# Patient Record
Sex: MALE | URBAN - METROPOLITAN AREA
[De-identification: names, ages, dates, MRNs, and addresses within clinical notes are randomized per-mention and may not be internally consistent; named-entity substitution may affect disease eponyms.]

---

## 2020-01-30 ENCOUNTER — APPOINTMENT (OUTPATIENT)
Age: 70
Setting detail: DERMATOLOGY
End: 2020-01-31

## 2020-01-30 DIAGNOSIS — L85.3 XEROSIS CUTIS: ICD-10-CM

## 2020-01-30 DIAGNOSIS — L40.0 PSORIASIS VULGARIS: ICD-10-CM

## 2020-01-30 DIAGNOSIS — D22 MELANOCYTIC NEVI: ICD-10-CM

## 2020-01-30 PROBLEM — L30.9 DERMATITIS, UNSPECIFIED: Status: ACTIVE | Noted: 2020-01-30

## 2020-01-30 PROBLEM — D22.5 MELANOCYTIC NEVI OF TRUNK: Status: ACTIVE | Noted: 2020-01-30

## 2020-01-30 PROBLEM — D22.61 MELANOCYTIC NEVI OF RIGHT UPPER LIMB, INCLUDING SHOULDER: Status: ACTIVE | Noted: 2020-01-30

## 2020-01-30 PROCEDURE — 99203 OFFICE O/P NEW LOW 30 MIN: CPT | Mod: 25

## 2020-01-30 PROCEDURE — 11104 PUNCH BX SKIN SINGLE LESION: CPT

## 2020-01-30 PROCEDURE — OTHER MIPS QUALITY: OTHER

## 2020-01-30 PROCEDURE — 11105 PUNCH BX SKIN EA SEP/ADDL: CPT

## 2020-01-30 PROCEDURE — OTHER COUNSELING: OTHER

## 2020-01-30 PROCEDURE — OTHER BIOPSY BY PUNCH METHOD: OTHER

## 2020-01-30 PROCEDURE — OTHER REASSURANCE: OTHER

## 2020-01-30 PROCEDURE — OTHER PRESCRIPTION: OTHER

## 2020-01-30 RX ORDER — KETOCONAZOLE 20 MG/G
2% CREAM TOPICAL BID
Qty: 1 | Refills: 3 | Status: ERX | COMMUNITY
Start: 2020-01-30

## 2020-01-30 RX ORDER — KETOCONAZOLE 20 MG/ML
2% SHAMPOO TOPICAL
Qty: 1 | Refills: 2 | Status: ERX | COMMUNITY
Start: 2020-01-30

## 2020-01-30 ASSESSMENT — LOCATION ZONE DERM
LOCATION ZONE: ARM
LOCATION ZONE: TRUNK
LOCATION ZONE: LEG
LOCATION ZONE: SCALP

## 2020-01-30 ASSESSMENT — LOCATION DETAILED DESCRIPTION DERM
LOCATION DETAILED: LEFT CENTRAL PARIETAL SCALP
LOCATION DETAILED: RIGHT POSTERIOR SHOULDER
LOCATION DETAILED: LEFT SUPERIOR LATERAL UPPER BACK
LOCATION DETAILED: LEFT DISTAL CALF

## 2020-01-30 ASSESSMENT — LOCATION SIMPLE DESCRIPTION DERM
LOCATION SIMPLE: SCALP
LOCATION SIMPLE: LEFT CALF
LOCATION SIMPLE: RIGHT SHOULDER
LOCATION SIMPLE: LEFT UPPER BACK

## 2020-01-30 NOTE — PROCEDURE: MIPS QUALITY
Quality 110: Preventive Care And Screening: Influenza Immunization: Influenza Immunization not Administered for Documented Reasons.
Quality 474: Zoster Vaccination Status: Shingrix Vaccination not Administered or Previously Received, Reason not Otherwise Specified
Quality 226: Preventive Care And Screening: Tobacco Use: Screening And Cessation Intervention: Patient screened for tobacco use and is an ex/non-smoker
Detail Level: Detailed
Quality 111:Pneumonia Vaccination Status For Older Adults: Pneumococcal Vaccination not Administered or Previously Received, Reason not Otherwise Specified

## 2020-01-30 NOTE — PROCEDURE: BIOPSY BY PUNCH METHOD
Post-Care Instructions: I reviewed with the patient in detail post-care instructions. Patient is to keep the biopsy site dry overnight, and then apply bacitracin twice daily until healed. Patient may apply hydrogen peroxide soaks to remove any crusting.
Additional Anesthesia Volume In Cc (Will Not Render If 0): 0
Anesthesia Volume In Cc (Will Not Render If 0): 0.5
Epidermal Sutures: 4-0 Ethilon
Suture Removal: 14 days
Render Path Notes In Note?: No
Hemostasis: None
Was A Bandage Applied: Yes
Consent: Written consent was obtained and risks were reviewed including but not limited to scarring, infection, bleeding, scabbing, incomplete removal, nerve damage and allergy to anesthesia.
Dressing: bandage
Detail Level: Detailed
Punch Size In Mm: 3
Anesthesia Type: 1% lidocaine with epinephrine
Biopsy Type: H and E
Home Suture Removal Text: Patient was provided a home suture removal kit and will remove their sutures at home.  If they have any questions or difficulties they will call the office.
Wound Care: Bacitracin
Notification Instructions: Patient will be notified of biopsy results. However, patient instructed to call the office if not contacted within 2 weeks.
Billing Type: Third-Party Bill

## 2020-02-13 ENCOUNTER — APPOINTMENT (OUTPATIENT)
Age: 70
Setting detail: DERMATOLOGY
End: 2020-02-13

## 2020-02-13 DIAGNOSIS — Z48.02 ENCOUNTER FOR REMOVAL OF SUTURES: ICD-10-CM

## 2020-02-13 PROCEDURE — OTHER SUTURE REMOVAL (GLOBAL PERIOD): OTHER

## 2020-02-13 PROCEDURE — OTHER PRESCRIPTION: OTHER

## 2020-02-13 PROCEDURE — 99024 POSTOP FOLLOW-UP VISIT: CPT

## 2020-02-13 PROCEDURE — OTHER MIPS QUALITY: OTHER

## 2020-02-13 RX ORDER — CLOBETASOL PROPIONATE 0.5 MG/ML
0.05% SOLUTION TOPICAL AT BEDTIME
Qty: 1 | Refills: 3 | Status: ERX | COMMUNITY
Start: 2020-02-13

## 2020-02-13 ASSESSMENT — LOCATION ZONE DERM
LOCATION ZONE: LEG
LOCATION ZONE: SCALP

## 2020-02-13 ASSESSMENT — LOCATION SIMPLE DESCRIPTION DERM
LOCATION SIMPLE: SCALP
LOCATION SIMPLE: LEFT CALF

## 2020-02-13 ASSESSMENT — LOCATION DETAILED DESCRIPTION DERM
LOCATION DETAILED: LEFT CENTRAL PARIETAL SCALP
LOCATION DETAILED: LEFT DISTAL CALF

## 2020-02-13 NOTE — PROCEDURE: SUTURE REMOVAL (GLOBAL PERIOD)
Add 22599 Cpt? (Important Note: In 2017 The Use Of 63003 Is Being Tracked By Cms To Determine Future Global Period Reimbursement For Global Periods): yes
Detail Level: Detailed

## 2022-01-05 ENCOUNTER — APPOINTMENT (OUTPATIENT)
Age: 72
Setting detail: DERMATOLOGY
End: 2022-01-06

## 2022-01-05 DIAGNOSIS — L40.8 OTHER PSORIASIS: ICD-10-CM

## 2022-01-05 PROCEDURE — 99213 OFFICE O/P EST LOW 20 MIN: CPT

## 2022-01-05 PROCEDURE — OTHER PRESCRIPTION: OTHER

## 2022-01-05 PROCEDURE — OTHER COUNSELING: OTHER

## 2022-01-05 PROCEDURE — OTHER ADDITIONAL NOTES: OTHER

## 2022-01-05 RX ORDER — CLOBETASOL PROPIONATE 0.05 G/100ML
0.05% SHAMPOO TOPICAL
Qty: 118 | Refills: 2 | Status: ERX | COMMUNITY
Start: 2022-01-05

## 2022-01-05 RX ORDER — CLOBETASOL PROPIONATE 0.5 MG/ML
0.05% SOLUTION TOPICAL QHS
Qty: 50 | Refills: 4 | Status: ERX | COMMUNITY
Start: 2022-01-05

## 2022-01-05 RX ORDER — CLOBETASOL PROPIONATE 0.5 MG/G
0.05% CREAM TOPICAL BID
Qty: 60 | Refills: 2 | Status: ERX | COMMUNITY
Start: 2022-01-05

## 2022-01-05 ASSESSMENT — LOCATION SIMPLE DESCRIPTION DERM
LOCATION SIMPLE: RIGHT OCCIPITAL SCALP
LOCATION SIMPLE: LEFT CALF
LOCATION SIMPLE: POSTERIOR SCALP
LOCATION SIMPLE: RIGHT CALF
LOCATION SIMPLE: LEFT PRETIBIAL REGION
LOCATION SIMPLE: LEFT OCCIPITAL SCALP
LOCATION SIMPLE: SCALP
LOCATION SIMPLE: RIGHT PRETIBIAL REGION

## 2022-01-05 ASSESSMENT — LOCATION DETAILED DESCRIPTION DERM
LOCATION DETAILED: RIGHT DISTAL PRETIBIAL REGION
LOCATION DETAILED: LEFT SUPERIOR OCCIPITAL SCALP
LOCATION DETAILED: RIGHT PROXIMAL PRETIBIAL REGION
LOCATION DETAILED: RIGHT CENTRAL PARIETAL SCALP
LOCATION DETAILED: LEFT PROXIMAL PRETIBIAL REGION
LOCATION DETAILED: LEFT PROXIMAL CALF
LOCATION DETAILED: RIGHT OCCIPITAL SCALP
LOCATION DETAILED: RIGHT PROXIMAL CALF
LOCATION DETAILED: RIGHT SUPERIOR OCCIPITAL SCALP
LOCATION DETAILED: RIGHT DISTAL CALF
LOCATION DETAILED: LEFT DISTAL CALF
LOCATION DETAILED: LEFT OCCIPITAL SCALP
LOCATION DETAILED: MID-OCCIPITAL SCALP

## 2022-01-05 ASSESSMENT — LOCATION ZONE DERM
LOCATION ZONE: SCALP
LOCATION ZONE: LEG

## 2022-01-05 NOTE — PROCEDURE: ADDITIONAL NOTES
Additional Notes: Pt has developed thick scabs in the scalp with no important, we discussed methotrexate. Pt states his platelets have been lower. We will request labs from PCP once reviewed we will discuss with Dr Sadnhu the possibility of starting methotrexate. Additional Notes: Pt has developed thick scabs in the scalp with no important, we discussed methotrexate. Pt states his platelets have been lower. We will request labs from PCP once reviewed we will discuss with Dr Sandhu the possibility of starting methotrexate.

## 2022-02-16 ENCOUNTER — APPOINTMENT (OUTPATIENT)
Age: 72
Setting detail: DERMATOLOGY
End: 2022-02-17

## 2022-02-16 DIAGNOSIS — Z79.899 OTHER LONG TERM (CURRENT) DRUG THERAPY: ICD-10-CM

## 2022-02-16 DIAGNOSIS — L40.8 OTHER PSORIASIS: ICD-10-CM

## 2022-02-16 PROCEDURE — OTHER REASSURANCE: OTHER

## 2022-02-16 PROCEDURE — OTHER ORDER TESTS: OTHER

## 2022-02-16 PROCEDURE — OTHER HIGH RISK MEDICATION MONITORING: OTHER

## 2022-02-16 PROCEDURE — OTHER MIPS QUALITY: OTHER

## 2022-02-16 PROCEDURE — OTHER COUNSELING: OTHER

## 2022-02-16 PROCEDURE — OTHER ADDITIONAL NOTES: OTHER

## 2022-02-16 PROCEDURE — 99213 OFFICE O/P EST LOW 20 MIN: CPT

## 2022-02-16 NOTE — PROCEDURE: HIGH RISK MEDICATION MONITORING
unable to assess Opioid Pregnancy And Lactation Text: These medications can lead to premature delivery and should be avoided during pregnancy. These medications are also present in breast milk in small amounts.

## 2022-02-16 NOTE — PROCEDURE: MIPS QUALITY
Quality 110: Preventive Care And Screening: Influenza Immunization: Influenza Immunization not Administered because Patient Refused.
Quality 111:Pneumonia Vaccination Status For Older Adults: Pneumococcal Vaccination not Administered or Previously Received, Reason not Otherwise Specified
Detail Level: Detailed
Quality 130: Documentation Of Current Medications In The Medical Record: Current Medications Documented
Quality 226: Preventive Care And Screening: Tobacco Use: Screening And Cessation Intervention: Patient screened for tobacco use and is an ex/non-smoker
Quality 47: Advance Care Plan: Advance Care Planning discussed and documented in the medical record; patient did not wish or was not able to name a surrogate decision maker or provide an advance care plan.

## 2022-02-28 ENCOUNTER — RX ONLY (RX ONLY)
Age: 72
End: 2022-02-28

## 2022-02-28 RX ORDER — FOLIC ACID 1 MG/1
1 TABLET ORAL QD
Qty: 24 | Refills: 2 | Status: ERX | COMMUNITY
Start: 2022-02-28

## 2022-02-28 RX ORDER — METHOTREXATE SODIUM 2.5 MG/1
1 TABLET ORAL
Qty: 20 | Refills: 2 | Status: ERX | COMMUNITY
Start: 2022-02-28

## 2023-04-13 ENCOUNTER — APPOINTMENT (OUTPATIENT)
Dept: URBAN - METROPOLITAN AREA SURGERY 21 | Age: 73
Setting detail: DERMATOLOGY
End: 2023-04-13

## 2023-04-13 DIAGNOSIS — D49.2 NEOPLASM OF UNSPECIFIED BEHAVIOR OF BONE, SOFT TISSUE, AND SKIN: ICD-10-CM

## 2023-04-13 DIAGNOSIS — L40.8 OTHER PSORIASIS: ICD-10-CM

## 2023-04-13 PROCEDURE — 11104 PUNCH BX SKIN SINGLE LESION: CPT

## 2023-04-13 PROCEDURE — OTHER REASSURANCE: OTHER

## 2023-04-13 PROCEDURE — OTHER BIOPSY BY SHAVE METHOD: OTHER

## 2023-04-13 PROCEDURE — OTHER BIOPSY BY PUNCH METHOD: OTHER

## 2023-04-13 PROCEDURE — OTHER PRESCRIPTION: OTHER

## 2023-04-13 PROCEDURE — OTHER ADDITIONAL NOTES: OTHER

## 2023-04-13 PROCEDURE — 11103 TANGNTL BX SKIN EA SEP/ADDL: CPT

## 2023-04-13 PROCEDURE — 99213 OFFICE O/P EST LOW 20 MIN: CPT | Mod: 25

## 2023-04-13 PROCEDURE — OTHER ORDER TESTS: OTHER

## 2023-04-13 PROCEDURE — OTHER COUNSELING: OTHER

## 2023-04-13 RX ORDER — RISANKIZUMAB-RZAA 150 MG/ML
INJECTION SUBCUTANEOUS
Qty: 2 | Refills: 1 | Status: ERX | COMMUNITY
Start: 2023-04-13

## 2023-04-13 ASSESSMENT — LOCATION SIMPLE DESCRIPTION DERM
LOCATION SIMPLE: SCALP
LOCATION SIMPLE: RIGHT ELBOW
LOCATION SIMPLE: LEFT SHOULDER
LOCATION SIMPLE: RIGHT BUCCAL MUCOSA
LOCATION SIMPLE: LEFT FOREHEAD

## 2023-04-13 ASSESSMENT — LOCATION DETAILED DESCRIPTION DERM
LOCATION DETAILED: RIGHT BUCCAL MUCOSA
LOCATION DETAILED: LEFT POSTERIOR SHOULDER
LOCATION DETAILED: RIGHT LATERAL ELBOW
LOCATION DETAILED: LEFT CENTRAL PARIETAL SCALP
LOCATION DETAILED: RIGHT CENTRAL PARIETAL SCALP
LOCATION DETAILED: LEFT SUPERIOR FOREHEAD

## 2023-04-13 ASSESSMENT — LOCATION ZONE DERM
LOCATION ZONE: FACE
LOCATION ZONE: MUCOUS_MEMBRANE
LOCATION ZONE: ARM
LOCATION ZONE: SCALP

## 2023-04-13 ASSESSMENT — BSA PSORIASIS: % BODY COVERED IN PSORIASIS: 30

## 2023-04-13 NOTE — HPI: OTHER
Condition:: Sebopsoriasis\\nRx methotrexate and folic acid\\nLabs drawn
Please Describe Your Condition:: .

## 2023-04-13 NOTE — PROCEDURE: BIOPSY BY SHAVE METHOD

## 2023-04-13 NOTE — PROCEDURE: BIOPSY BY PUNCH METHOD

## 2023-04-27 ENCOUNTER — APPOINTMENT (OUTPATIENT)
Dept: URBAN - METROPOLITAN AREA SURGERY 21 | Age: 73
Setting detail: DERMATOLOGY
End: 2023-04-27

## 2023-04-27 DIAGNOSIS — L40.8 OTHER PSORIASIS: ICD-10-CM

## 2023-04-27 DIAGNOSIS — Z48.02 ENCOUNTER FOR REMOVAL OF SUTURES: ICD-10-CM

## 2023-04-27 PROCEDURE — OTHER SUTURE REMOVAL (NO GLOBAL PERIOD): OTHER

## 2023-04-27 PROCEDURE — OTHER COUNSELING: OTHER

## 2023-04-27 PROCEDURE — 99212 OFFICE O/P EST SF 10 MIN: CPT

## 2023-04-27 PROCEDURE — OTHER REASSURANCE: OTHER

## 2023-04-27 PROCEDURE — OTHER ADDITIONAL NOTES: OTHER

## 2023-04-27 ASSESSMENT — LOCATION DETAILED DESCRIPTION DERM
LOCATION DETAILED: LEFT POSTERIOR SHOULDER
LOCATION DETAILED: MID-FRONTAL SCALP
LOCATION DETAILED: RIGHT SUPERIOR PARIETAL SCALP

## 2023-04-27 ASSESSMENT — LOCATION ZONE DERM
LOCATION ZONE: ARM
LOCATION ZONE: SCALP

## 2023-04-27 ASSESSMENT — LOCATION SIMPLE DESCRIPTION DERM
LOCATION SIMPLE: LEFT SHOULDER
LOCATION SIMPLE: ANTERIOR SCALP
LOCATION SIMPLE: SCALP

## 2023-04-27 NOTE — PROCEDURE: ADDITIONAL NOTES
Detail Level: Simple
Render Risk Assessment In Note?: no
Additional Notes: Patient approved for [facility], [facility] will be reaching out to schedule delivery. Patient aware

## 2023-05-03 ENCOUNTER — APPOINTMENT (OUTPATIENT)
Dept: URBAN - METROPOLITAN AREA SURGERY 21 | Age: 73
Setting detail: DERMATOLOGY
End: 2023-05-04

## 2023-05-03 DIAGNOSIS — L40.8 OTHER PSORIASIS: ICD-10-CM

## 2023-05-03 PROCEDURE — OTHER COUNSELING: OTHER

## 2023-05-03 PROCEDURE — OTHER ADDITIONAL NOTES: OTHER

## 2023-05-03 PROCEDURE — 99212 OFFICE O/P EST SF 10 MIN: CPT

## 2023-05-03 ASSESSMENT — LOCATION SIMPLE DESCRIPTION DERM: LOCATION SIMPLE: SCALP

## 2023-05-03 ASSESSMENT — LOCATION DETAILED DESCRIPTION DERM: LOCATION DETAILED: RIGHT SUPERIOR PARIETAL SCALP

## 2023-05-03 ASSESSMENT — LOCATION ZONE DERM: LOCATION ZONE: SCALP

## 2023-05-19 ENCOUNTER — RX ONLY (RX ONLY)
Age: 73
End: 2023-05-19

## 2023-05-19 RX ORDER — CLOBETASOL PROPIONATE 0.5 MG/ML
SOLUTION TOPICAL
Qty: 50 | Refills: 3 | Status: ERX | COMMUNITY
Start: 2023-05-19

## 2023-05-19 RX ORDER — KETOCONAZOLE 20 MG/ML
SHAMPOO, SUSPENSION TOPICAL
Qty: 120 | Refills: 3 | Status: ERX | COMMUNITY
Start: 2023-05-19

## 2025-01-02 NOTE — PROCEDURE: REASSURANCE
H&P reviewed. The patient was examined and there are no changes to the H&P.        
Detail Level: Detailed
Hide Additional Notes?: No